# Patient Record
Sex: MALE | Race: BLACK OR AFRICAN AMERICAN | Employment: FULL TIME | URBAN - METROPOLITAN AREA
[De-identification: names, ages, dates, MRNs, and addresses within clinical notes are randomized per-mention and may not be internally consistent; named-entity substitution may affect disease eponyms.]

---

## 2018-06-04 PROBLEM — I63.9 CVA (CEREBRAL VASCULAR ACCIDENT) (HCC): Status: ACTIVE | Noted: 2018-06-04

## 2019-02-20 ENCOUNTER — OFFICE VISIT (OUTPATIENT)
Dept: FAMILY MEDICINE CLINIC | Age: 49
End: 2019-02-20

## 2019-02-20 VITALS
WEIGHT: 201.4 LBS | HEART RATE: 77 BPM | HEIGHT: 70 IN | DIASTOLIC BLOOD PRESSURE: 86 MMHG | SYSTOLIC BLOOD PRESSURE: 129 MMHG | OXYGEN SATURATION: 98 % | RESPIRATION RATE: 16 BRPM | BODY MASS INDEX: 28.83 KG/M2 | TEMPERATURE: 96.4 F

## 2019-02-20 DIAGNOSIS — R59.1 LYMPHADENOPATHY: ICD-10-CM

## 2019-02-20 DIAGNOSIS — R20.2 PARESTHESIA OF BOTH HANDS: ICD-10-CM

## 2019-02-20 DIAGNOSIS — Z12.5 SCREENING PSA (PROSTATE SPECIFIC ANTIGEN): ICD-10-CM

## 2019-02-20 DIAGNOSIS — L23.0 ALLERGIC CONTACT DERMATITIS DUE TO METALS: ICD-10-CM

## 2019-02-20 DIAGNOSIS — Z13.29 SCREENING FOR THYROID DISORDER: Primary | ICD-10-CM

## 2019-02-20 DIAGNOSIS — Z20.2 STD EXPOSURE: ICD-10-CM

## 2019-02-20 DIAGNOSIS — R22.0 MASS OF SUBMANDIBULAR REGION: ICD-10-CM

## 2019-02-20 DIAGNOSIS — Z86.73 HISTORY OF CVA (CEREBROVASCULAR ACCIDENT): ICD-10-CM

## 2019-02-20 DIAGNOSIS — R93.89 ABNORMAL CT SCAN, NECK: ICD-10-CM

## 2019-02-20 RX ORDER — GUAIFENESIN 100 MG/5ML
81 LIQUID (ML) ORAL DAILY
Qty: 90 TAB | Refills: 0 | Status: SHIPPED | OUTPATIENT
Start: 2019-02-20 | End: 2021-12-24

## 2019-02-20 RX ORDER — ROSUVASTATIN CALCIUM 5 MG/1
5 TABLET, COATED ORAL
Qty: 90 TAB | Refills: 0 | Status: SHIPPED | OUTPATIENT
Start: 2019-02-20 | End: 2021-12-24

## 2019-02-20 RX ORDER — AMLODIPINE BESYLATE 5 MG/1
5 TABLET ORAL DAILY
Qty: 90 TAB | Refills: 0 | Status: SHIPPED | OUTPATIENT
Start: 2019-02-20 | End: 2019-07-02 | Stop reason: DRUGHIGH

## 2019-02-20 RX ORDER — TACROLIMUS 0.3 MG/G
OINTMENT TOPICAL
Qty: 1 TUBE | Refills: 1 | Status: SHIPPED | OUTPATIENT
Start: 2019-02-20

## 2019-02-20 NOTE — PROGRESS NOTES
Chief Complaint   Patient presents with   [address] Cerebrovascular Accident     Patient states that he had a stroke 2/2018 but never had any followup after hospital discharge. 1. Have you been to the ER, urgent care clinic since your last visit? Hospitalized since your last visit? No    2. Have you seen or consulted any other health care providers outside of the [address] [place] [address] since your last visit? Include any pap smears or colon screening.  No

## 2019-02-21 DIAGNOSIS — Z13.29 SCREENING FOR THYROID DISORDER: ICD-10-CM

## 2019-02-21 DIAGNOSIS — R59.1 LYMPHADENOPATHY: ICD-10-CM

## 2019-02-21 DIAGNOSIS — Z86.73 HISTORY OF CVA (CEREBROVASCULAR ACCIDENT): ICD-10-CM

## 2019-02-24 NOTE — PROGRESS NOTES
ESTABLISH CARE VISIT    SUBJECTIVE:     Chief Complaint   Patient presents with   [address] [facility] [address] [facility] [address] Cerebrovascular Accident     Patient states that he had a stroke 2/2018 but never had any followup after hospital discharge. HPI: 50 y.o. male with PMHx significant for CVA is here for the above chief complaint(s). CVA  Patient went to [facility] in 6/2018 2 to having weakness in his left leg. MRI showed that he had multiple strokes in the last 6 months. Patient was discharged on aspirin, amlodipine and lipitor. Patient took these medications for 2 months, he saw Dr. Gareth Kearns and then decided to change diet/lifestyle (quit smoking) and quit medications. He also lost his insurance at that time, so that is why he was lost to follow-up. Patient has no residual weakness, numbness/tingling. He has not followed up with anyone since August/September. Cervical Lymphadenopathy/Submandibular Mass  Ongoing, for completeness, will outline the impressions from the CT/MRI from 6/2018. CT Chest:  IMPRESSION  Impression: Hyperinflation of the chest with COPD. No acute abnormalities. CT Abd/Pelvis:  Impression:   Degenerative disc disease L5-S1. Abdomen and pelvis CT scan otherwise negative. CT Head:  IMPRESSION:  Normal unenhanced CT scan of the head.     CTA Head:     1. No significant stenosis or aneurysm involving the [place]  vasculature.     2. No significant cervical carotid or vertebral artery stenosis.     3. Multiple enlarged left neck lymph nodes, most concerning for occult  malignancy with reactive lymphadenopathy considered less likely. Tissue sampling recommended. (Neck Soft Tissues: There is an enlarged left submandibular lymph node (level 1)  measuring 2.6 x 3.2 cm. There is an enlarged left level 2 lymph node measuring  2.1 x 1.9 cm. There are multiple enlarged left supraclavicular lymph nodes  measuring 1.2 and 1.4 cm respectively. Major salivary glands are within normal  limits. Visualized aerodigestive tract is unremarkable. No focal abnormality  involving the lung apices)    MRI Brain:  IMPRESSION:     1.  Multiple small acute lacunar infarcts in the right posterior frontal lobe in  an anterior cerebral artery distribution. 2.  Mild chronic microvascular ischemic changes. Addendum to MRI from radiology on 6/6/2018  Addendum     While the faint enhancement in the parafalcine right parietal lobe was initially  thought to represent a developmental venous anomaly, given the lymphadenopathy on CTA neck, followup MRI in 6-8 weeks is recommended to exclude neoplasm or metastasis.     Discussed with nurse Davies on 6/6/2018 at 9:50 AM.        STD Exposure  Patient would like screening for possible STD's. He has had mulitple partners and has not had any STD screenings recently. He denies any symptoms at this time. Denies headache, lightheadedness, dizziness, vision changes, chest pain at rest or with exertion, rapid/irregular heart rate, shortness of breath at rest or with exertion, cough, abdominal pain, leg swelling, leg pain. Health Maintenance:    Eye - 2017, patient wears glasses, recommended vision exam   Oral Health - patient saw dentist less than a year ago for root canal, hasn't followed up for cleaning  Hearing, no complaints  U/A -  no UTI sxs. Cardiac- denies history, denies chest pain. Pulmonary health/Smoking history- cigars, 3 cigars a week for 20 years  Testicular Exam - does self-exams, declines exam today. Prostate - no prostate cancer in the family, patient is low risk, screen at 50   Colonoscopy - 2005 and 2008- hemorrhoids, but otherwise WNL, no follow-up required, patient has occasional rectal bleeding (once every 3 months),  Will refer for colonoscopy      Review of Systems   Constitutional: Negative for chills, diaphoresis, fever, malaise/fatigue and weight loss.    HENT: Negative for congestion, ear pain, hearing loss, nosebleeds, sinus pain, sore throat and tinnitus. Eyes: Negative for blurred vision, double vision, pain and discharge. Respiratory: Negative for cough, hemoptysis, shortness of breath and wheezing. Cardiovascular: Negative for chest pain, palpitations, orthopnea, claudication, leg swelling and PND. Gastrointestinal: Negative for abdominal pain, blood in stool, constipation, diarrhea, heartburn, melena, nausea and vomiting. Genitourinary: Negative for dysuria, flank pain, frequency, hematuria and urgency. Musculoskeletal: Negative for back pain, joint pain, myalgias and neck pain. Skin: Positive for rash (abdominal- near belt line, posterior neck). Negative for itching. Neurological: Negative for dizziness, tingling, tremors, speech change, focal weakness, seizures, weakness and headaches. Endo/Heme/Allergies: Negative for polydipsia. Does not bruise/bleed easily. Psychiatric/Behavioral: Negative for depression, memory loss, substance abuse and suicidal ideas. The patient is not nervous/anxious and does not have insomnia. @Lightera@  No current outpatient medications on file. No current facility-administered medications for this visit. Health Maintenance   Topic Date Due    DTaP/Tdap/Td series (1 - Tdap) 09/16/1991    Influenza Age 5 to Adult  08/01/2018       Medications and Allergies: Reviewed and confirmed in the chart    Past Medical Hx: Reviewed and confirmed in the chart  Past Medical History:   Diagnosis Date    CVA (cerebral vascular accident) ([facility] [address]        There is no problem list on file for this patient. Family Hx, Surgical Hx, Social Hx: Reviewed and updated in EMR    OBJECTIVE:  Physical Exam   Constitutional: He is well-developed, well-nourished, and in no distress. No distress. HENT:   Head: Normocephalic and atraumatic. Right Ear: External ear normal.   Left Ear: External ear normal.   Mouth/Throat: Oropharynx is clear and moist. No oropharyngeal exudate.    Neck: Normal range of motion and full passive range of motion without pain. Normal carotid pulses, no hepatojugular reflux and no JVD present. Carotid bruit is not present. No tracheal deviation present. Thyroid mass present. No thyromegaly present. Large submandibular mass 2-3cm  Multiple enlarged cervical lymphadenopathy - bilateral  +supraclavicular lymphadenopathy- bilateral   Cardiovascular: Normal rate, regular rhythm, normal heart sounds and intact distal pulses. Exam reveals no gallop and no friction rub. No murmur heard. Pulses:       Carotid pulses are 1+ on the right side, and 1+ on the left side. Pulmonary/Chest: Effort normal and breath sounds normal. No stridor. No respiratory distress. He has no wheezes. He has no rales. He exhibits no tenderness. Abdominal: Soft. Bowel sounds are normal. He exhibits no distension and no mass. There is no tenderness. There is no rebound and no guarding. Lymphadenopathy:     He has cervical adenopathy. Skin: Skin is warm and dry. Rash noted. No purpura noted. Rash is macular. Rash is not papular, not maculopapular, not nodular, not pustular, not vesicular and not urticarial. He is not diaphoretic. No erythema. No pallor. Abdomen and posterior neck- +macular rash with mild scaling, hyperpigmented, no redness, warmth, drainage or bleeding. Psychiatric: Mood, memory, affect and judgment normal.   Vitals reviewed. Vitals:    02/20/19 1303   BP: 129/86   Pulse: 77   Resp: 16   Temp: 96.4 °F (35.8 °C)   TempSrc: Oral   SpO2: 98%   Weight: 201 lb 6.4 oz (91.4 kg)   Height: 5' 10\" (1.778 m)       BP Readings from Last 3 Encounters:   02/20/19 129/86     Wt Readings from Last 3 Encounters:   02/20/19 201 lb 6.4 oz (91.4 kg)       Nursing Notes Reviewed    ASSESSMENT AND PLAN  Diagnoses and all orders for this visit:    Explained to patient the seriousness of the findings from the MRI/CT of the neck and my concern for the mass/lymphadenopathy.  Advised urgency in the imaging and referral to ENT. Patient verbalizes understanding of consequences of non-compliance and the possible severity of illness. Will follow closely. 1. Screening for thyroid disorder  -     TSH 3RD GENERATION; Future    2. STD exposure  -     CHLAMYDIA/GC PCR; Future  -     T PALLIDUM AB; Future  -     HEP B SURFACE AG; Future  -     HEPATITIS C AB; Future  -     HIV 1/2 AG/AB, 4TH GENERATION,W RFLX CONFIRM; Future    3. Paresthesia of both hands  -     VITAMIN B12; Future    4. Screening PSA (prostate specific antigen)  -     PSA SCREENING (SCREENING); Future    5. History of CVA (cerebrovascular accident)  -     METABOLIC PANEL, COMPREHENSIVE; Future  -     LIPID PANEL; Future  -     DUPLEX CAROTID BILATERAL; Future  -     rosuvastatin (CRESTOR) 5 mg tablet; Take 1 Tab by mouth nightly. -     amLODIPine (NORVASC) 5 mg tablet; Take 1 Tab by mouth daily. -     aspirin 81 mg chewable tablet; Take 1 Tab by mouth daily. 6. Lymphadenopathy  -     CBC WITH AUTOMATED DIFF; Future  -     REFERRAL TO ENT-OTOLARYNGOLOGY  -     CT NECK SOFT TISSUE W CONT; Future  -     US THYROID/PARATHYROID/SOFT TISS; Future    7. Abnormal CT scan, neck  -     REFERRAL TO ENT-OTOLARYNGOLOGY  -     CT NECK SOFT TISSUE W CONT; Future    8. Mass of submandibular region  -     US THYROID/PARATHYROID/SOFT TISS; Future    9. 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 No orders of the defined types were placed in this encounter. No future appointments. AVS printed and provided to patient    Assessment and plan above discussed with patient, patient voiced understanding and agreement with plan. More than 50% of this 45 minute visit was spent face to face counseling the patient about the etiology and treatment options for the above health conditions outlined in assessment and plan. Malou MONTALVO  [address] [place] [address] Associates  [address] [place], [address] [place] [address], [address] [place] [address]   [place] [phone]  [phone]

## 2019-03-08 ENCOUNTER — LAB ONLY (OUTPATIENT)
Dept: FAMILY MEDICINE CLINIC | Age: 49
End: 2019-03-08

## 2019-03-08 ENCOUNTER — TELEPHONE (OUTPATIENT)
Dept: FAMILY MEDICINE CLINIC | Age: 49
End: 2019-03-08

## 2019-03-08 DIAGNOSIS — Z01.89 ROUTINE LAB DRAW: Primary | ICD-10-CM

## 2019-03-08 NOTE — TELEPHONE ENCOUNTER
Kam Marlow with [facility] following up on peer to peer for Ct of the neck. States see in connect care an order for Duplex carotid bilateral.  Kam Marlow inquiring if provider want this order instead of the CT. States please call on Monday to clarify order.   # [phone]

## 2019-03-08 NOTE — TELEPHONE ENCOUNTER
Brittanie Yadav called back to inform me that she was looking at the wrong chart and that provider must call [facility] to do a peer to peer. Call [employer] at [phone]. Chart routed to provider for peer to peer.

## 2019-03-08 NOTE — TELEPHONE ENCOUNTER
Tried to contact Karoline Felton at [facility] in reference to this procedure but she had left for the day. Spoke to another rep and was informed that Karoline Felton did not make any notes about this patient, so she was not able to help me. Informed her that I will call back Monday.

## 2019-03-11 ENCOUNTER — TELEPHONE (OUTPATIENT)
Dept: FAMILY MEDICINE CLINIC | Age: 49
End: 2019-03-11

## 2019-03-11 NOTE — TELEPHONE ENCOUNTER
Pt stopped by and brought his insurance denial for CT, which is scheduled for tomorrow. Please call to instruct.

## 2019-03-11 NOTE — TELEPHONE ENCOUNTER
Spoke to patient. Informed him that we received the paper that he dropped off about CT being denied. Advised patient to reschedule his CT scan for a couple of days so that provider can call [employer] to try to get CT approved. Patient verbalized understanding. Chart routed to provider for review.

## 2019-03-11 NOTE — TELEPHONE ENCOUNTER
Spoke with [employer]. Discussed with physician that this was denied because it was sent to a \"non-preferred facility. \" Physician recommended patient see ENT for tissue sampling. Agree with physician, she did say that I could reopen, but in the interest of not delaying care, will have him just follow-up with ENT. Called patient- patient said that his apt was canceled for tomorrow. ..called ENT and they said he did not have an apt scheduled. Patient was called to schedule and he asked about how much his out of pocket costs would be, but did not schedule. Called patient back, advised to call them and schedule and find out out of pocket costs (without the CT). He will call back to let me know what he decides. Malou Grant PA-C  [facility]  [phone]  [facility], [address] [facility]

## 2019-03-12 ENCOUNTER — TELEPHONE (OUTPATIENT)
Dept: FAMILY MEDICINE CLINIC | Age: 49
End: 2019-03-12

## 2019-03-12 DIAGNOSIS — E53.8 B12 DEFICIENCY: Primary | ICD-10-CM

## 2019-03-12 DIAGNOSIS — E78.41 ELEVATED LIPOPROTEIN(A): ICD-10-CM

## 2019-03-12 DIAGNOSIS — I63.9 CEREBROVASCULAR ACCIDENT (CVA), UNSPECIFIED MECHANISM (HCC): ICD-10-CM

## 2019-03-12 LAB
ALBUMIN SERPL-MCNC: 4.6 G/DL (ref 3.5–5.5)
ALBUMIN/GLOB SERPL: 1.4 {RATIO} (ref 1.2–2.2)
ALP SERPL-CCNC: 84 IU/L (ref 39–117)
ALT SERPL-CCNC: 18 IU/L (ref 0–44)
AST SERPL-CCNC: 19 IU/L (ref 0–40)
BASOPHILS # BLD AUTO: 0 X10E3/UL (ref 0–0.2)
BASOPHILS NFR BLD AUTO: 1 %
BILIRUB SERPL-MCNC: 0.5 MG/DL (ref 0–1.2)
BUN SERPL-MCNC: 9 MG/DL (ref 6–24)
BUN/CREAT SERPL: 9 (ref 9–20)
C TRACH RRNA SPEC QL NAA+PROBE: NORMAL
CALCIUM SERPL-MCNC: 9.8 MG/DL (ref 8.7–10.2)
CHLORIDE SERPL-SCNC: 101 MMOL/L (ref 96–106)
CHOLEST SERPL-MCNC: 276 MG/DL (ref 100–199)
CO2 SERPL-SCNC: 27 MMOL/L (ref 20–29)
CREAT SERPL-MCNC: 1 MG/DL (ref 0.76–1.27)
EOSINOPHIL # BLD AUTO: 0.1 X10E3/UL (ref 0–0.4)
EOSINOPHIL NFR BLD AUTO: 3 %
ERYTHROCYTE [DISTWIDTH] IN BLOOD BY AUTOMATED COUNT: 13.5 % (ref 12.3–15.4)
GLOBULIN SER CALC-MCNC: 3.3 G/DL (ref 1.5–4.5)
GLUCOSE SERPL-MCNC: 98 MG/DL (ref 65–99)
HBV SURFACE AG SERPL QL IA: NEGATIVE
HCT VFR BLD AUTO: 46.1 % (ref 37.5–51)
HCV AB S/CO SERPL IA: <0.1 S/CO RATIO (ref 0–0.9)
HDLC SERPL-MCNC: 42 MG/DL
HGB BLD-MCNC: 15.6 G/DL (ref 13–17.7)
HIV 1+2 AB+HIV1 P24 AG SERPL QL IA: NON REACTIVE
IMM GRANULOCYTES # BLD AUTO: 0 X10E3/UL (ref 0–0.1)
IMM GRANULOCYTES NFR BLD AUTO: 0 %
LDLC SERPL CALC-MCNC: 201 MG/DL (ref 0–99)
LYMPHOCYTES # BLD AUTO: 1.7 X10E3/UL (ref 0.7–3.1)
LYMPHOCYTES NFR BLD AUTO: 41 %
MCH RBC QN AUTO: 30.6 PG (ref 26.6–33)
MCHC RBC AUTO-ENTMCNC: 33.8 G/DL (ref 31.5–35.7)
MCV RBC AUTO: 90 FL (ref 79–97)
MONOCYTES # BLD AUTO: 0.6 X10E3/UL (ref 0.1–0.9)
MONOCYTES NFR BLD AUTO: 14 %
N GONORRHOEA RRNA SPEC QL NAA+PROBE: NORMAL
NEUTROPHILS # BLD AUTO: 1.7 X10E3/UL (ref 1.4–7)
NEUTROPHILS NFR BLD AUTO: 41 %
PLATELET # BLD AUTO: 192 X10E3/UL (ref 150–379)
POTASSIUM SERPL-SCNC: 5 MMOL/L (ref 3.5–5.2)
PROT SERPL-MCNC: 7.9 G/DL (ref 6–8.5)
PSA SERPL-MCNC: 0.4 NG/ML (ref 0–4)
RBC # BLD AUTO: 5.1 X10E6/UL (ref 4.14–5.8)
SODIUM SERPL-SCNC: 143 MMOL/L (ref 134–144)
SPECIMEN STATUS REPORT, ROLRST: NORMAL
T PALLIDUM AB SER QL IA: NEGATIVE
TRIGL SERPL-MCNC: 165 MG/DL (ref 0–149)
TSH SERPL DL<=0.005 MIU/L-ACNC: 1.16 UIU/ML (ref 0.45–4.5)
VIT B12 SERPL-MCNC: 267 PG/ML (ref 232–1245)
VLDLC SERPL CALC-MCNC: 33 MG/DL (ref 5–40)
WBC # BLD AUTO: 4.1 X10E3/UL (ref 3.4–10.8)

## 2019-03-12 RX ORDER — CYANOCOBALAMIN 1000 UG/ML
1000 INJECTION, SOLUTION INTRAMUSCULAR; SUBCUTANEOUS ONCE
Qty: 1 VIAL | Refills: 3
Start: 2019-03-12 | End: 2019-03-12

## 2019-03-12 NOTE — TELEPHONE ENCOUNTER
Spoke with Mariluz Beatty to see if she had spoken with patient, Mariluz Beatty spoke with her today.  This encounter will be closed

## 2019-03-12 NOTE — TELEPHONE ENCOUNTER
Called patient in regards to his blood work. Patient's cholesterol is very elevated. Patient had not been taking his lipitor when this was drawn. Patient is now taking 80mg daily of Lipitor. Also, advised low cholesterol diet. Patient would like referral to nutrition to help him with better choices. Will refer. He also has b12 deficiency. Will call in b12 injections for him to take q month x 4 months. He also states that he called the ENT specialist and it would cost him $250 to be seen by them and he is unable to afford this. I told him that I will ask referral specialist to try and find someone else.

## 2019-03-12 NOTE — TELEPHONE ENCOUNTER
Pt requesting to speak with Ryan Craig regarding ENT referral.  States was told to call back regarding deductible info.  States will be free until 1pm

## 2019-03-12 NOTE — TELEPHONE ENCOUNTER
Please let patient know that his carotid ultrasound was WNL. No further testing is necessary. Malou Grant PA-C  [facility]  [phone]  [address]

## 2019-03-13 NOTE — TELEPHONE ENCOUNTER
Left message for patient to call back. Need to inform patient of Carotid US results as detailed in providers message.

## 2019-03-15 NOTE — TELEPHONE ENCOUNTER
Spoke with patient, patient informed me Armin Monzon spoke with him yesterday.  But he thanked me for the call

## 2019-04-04 ENCOUNTER — APPOINTMENT (OUTPATIENT)
Dept: NUTRITION | Age: 49
End: 2019-04-04

## 2019-07-02 ENCOUNTER — OFFICE VISIT (OUTPATIENT)
Dept: FAMILY MEDICINE CLINIC | Age: 49
End: 2019-07-02

## 2019-07-02 VITALS
RESPIRATION RATE: 16 BRPM | DIASTOLIC BLOOD PRESSURE: 80 MMHG | TEMPERATURE: 97.4 F | BODY MASS INDEX: 27.49 KG/M2 | HEART RATE: 85 BPM | HEIGHT: 70 IN | WEIGHT: 192 LBS | OXYGEN SATURATION: 98 % | SYSTOLIC BLOOD PRESSURE: 140 MMHG

## 2019-07-02 DIAGNOSIS — Z86.73 HISTORY OF CVA (CEREBROVASCULAR ACCIDENT): Primary | ICD-10-CM

## 2019-07-02 DIAGNOSIS — I10 ESSENTIAL (PRIMARY) HYPERTENSION: ICD-10-CM

## 2019-07-02 DIAGNOSIS — E53.8 B12 DEFICIENCY: ICD-10-CM

## 2019-07-02 RX ORDER — CYANOCOBALAMIN 1000 UG/ML
1000 INJECTION, SOLUTION INTRAMUSCULAR; SUBCUTANEOUS
Qty: 3 ML | Refills: 29
Start: 2019-07-02 | End: 2021-12-24

## 2019-07-02 RX ORDER — AMLODIPINE BESYLATE 10 MG/1
10 TABLET ORAL DAILY
Qty: 30 TAB | Refills: 2 | Status: SHIPPED | OUTPATIENT
Start: 2019-07-02 | End: 2021-12-24

## 2019-07-02 NOTE — PROGRESS NOTES
Chief Complaint   Patient presents with    Form Completion     Wants disability paperwork completed     1. Have you been to the ER, urgent care clinic since your last visit? Hospitalized since your last visit? No    2. Have you seen or consulted any other health care providers outside of the [address] [place] [address] since your last visit? Include any pap smears or colon screening.  Dermatologist

## 2019-07-02 NOTE — PATIENT INSTRUCTIONS
High Cholesterol: Care Instructions  Your Care Instructions    Cholesterol is a type of fat in your blood. It is needed for many body functions, such as making new cells. Cholesterol is made by your body. It also comes from food you eat. High cholesterol means that you have too much of the fat in your blood. This raises your risk of a heart attack and stroke. LDL and HDL are part of your total cholesterol. LDL is the \"bad\" cholesterol. High LDL can raise your risk for heart disease, heart attack, and stroke. HDL is the \"good\" cholesterol. It helps clear bad cholesterol from the body. High HDL is linked with a lower risk of heart disease, heart attack, and stroke. Your cholesterol levels help your doctor find out your risk for having a heart attack or stroke. You and your doctor can talk about whether you need to lower your risk and what treatment is best for you. A heart-healthy lifestyle along with medicines can help lower your cholesterol and your risk. The way you choose to lower your risk will depend on how high your risk is for heart attack and stroke. It will also depend on how you feel about taking medicines. Follow-up care is a key part of your treatment and safety. Be sure to make and go to all appointments, and call your doctor if you are having problems. It's also a good idea to know your test results and keep a list of the medicines you take. How can you care for yourself at home? · Eat a variety of foods every day. Good choices include fruits, vegetables, whole grains (like oatmeal), dried beans and peas, nuts and seeds, soy products (like tofu), and fat-free or low-fat dairy products. · Replace butter, margarine, and hydrogenated or partially hydrogenated oils with olive and canola oils. (Canola oil margarine without trans fat is fine.)  · Replace red meat with fish, poultry, and soy protein (like tofu). · Limit processed and packaged foods like chips, crackers, and cookies.   · Bake, broil, or steam foods. Don't mariee them. · Be physically active. Get at least 30 minutes of exercise on most days of the week. Walking is a good choice. You also may want to do other activities, such as running, swimming, cycling, or playing tennis or team sports. · Stay at a healthy weight or lose weight by making the changes in eating and physical activity listed above. Losing just a small amount of weight, even 5 to 10 pounds, can reduce your risk for having a heart attack or stroke. · Do not smoke. When should you call for help? Watch closely for changes in your health, and be sure to contact your doctor if:    · You need help making lifestyle changes.     · You have questions about your medicine. Where can you learn more? Go to http://irlandaLesConciergesvidal.info/. Enter R300 in the search box to learn more about \"High Cholesterol: Care Instructions. \"  Current as of: July 22, 2018  Content Version: 11.9  © 2884-0798 [employer]. Care instructions adapted under license by [employer] (which disclaims liability or warranty for this information). If you have questions about a medical condition or this instruction, always ask your healthcare professional. [place] [address] any warranty or liability for your use of this information. Hyperlipidemia: After Your Visit  Your Care Instructions  Hyperlipidemia is too much fat in your blood. The body has several kinds of fat, including cholesterol and triglycerides. Your body needs fat for many things, such as making new cells. But too much fat in your blood increases your chances of having a heart attack or stroke. You may be able to lower your cholesterol and triglycerides with a heart-healthy diet, exercise, and if needed, medicine. Your doctor may want you to try lifestyle changes first to see whether they lower the fat in your blood.  You may need to take medicine if lifestyle changes do not lower the fat in your blood enough. Follow-up care is a key part of your treatment and safety. Be sure to make and go to all appointments, and call your doctor if you are having problems. Its also a good idea to know your test results and keep a list of the medicines you take. How can you care for yourself at home? Take your medicines  · Take your medicines exactly as prescribed. Call your doctor if you think you are having a problem with your medicine. · If you take medicine to lower your cholesterol, go to follow-up visits. You will need to have blood tests. · Do not take large doses of niacin, which is a B vitamin, while taking medicine called statins. It may increase the chance of muscle pain and liver problems. · Talk to your doctor about avoiding grapefruit juice if you are taking statins. Grapefruit juice can raise the level of this medicine in your blood. This could increase side effects. Eat more fruits, vegetables, and fiber  · Fruits and vegetables have lots of nutrients that help protect against heart disease, and they have little--if any--fat. Try to eat at least five servings a day. Dark green, deep orange, or yellow fruits and vegetables are healthy choices. · Keep carrots, celery, and other veggies handy for snacks. Buy fruit that is in season and store it where you can see it so that you will be tempted to eat it. Cook dishes that have a lot of veggies in them, such as stir-fries and soups. · Foods high in fiber may reduce your cholesterol and provide important vitamins and minerals. High-fiber foods include whole-grain cereals and breads, oatmeal, beans, brown rice, citrus fruits, and apples. · Buy whole-grain breads and cereals instead of white bread and pastries. Limit saturated fat  · Read food labels and try to avoid saturated fat and trans fat. They increase your risk of heart disease. · Use olive or canola oil when you cook.  Try cholesterol-lowering spreads, such as Benecol or Take Control. · Bake, broil, grill, or steam foods instead of frying them. · Limit the amount of high-fat meats you eat, including hot dogs and sausages. Cut out all visible fat when you prepare meat. · Eat fish, skinless poultry, and soy products such as tofu instead of high-fat meats. Soybeans may be especially good for your heart. Eat at least two servings of fish a week. Certain fish, such as salmon, contain omega-3 fatty acids, which may help reduce your risk of heart attack. · Choose low-fat or fat-free milk and dairy products. Get exercise, limit alcohol, and quit smoking  · Get more exercise. Work with your doctor to set up an exercise program. Even if you can do only a small amount, exercise will help you get stronger, have more energy, and manage your weight and your stress. Walking is an easy way to get exercise. Gradually increase the amount you walk every day. Aim for at least 30 minutes on most days of the week. You also may want to swim, bike, or do other activities. · Limit alcohol to no more than 2 drinks a day for men and 1 drink a day for women. · Do not smoke. If you need help quitting, talk to your doctor about stop-smoking programs and medicines. These can increase your chances of quitting for good. When should you call for help? Call 911 anytime you think you may need emergency care. For example, call if:  · You have symptoms of a heart attack. These may include:  ¨ Chest pain or pressure, or a strange feeling in the chest.  ¨ Sweating. ¨ Shortness of breath. ¨ Nausea or vomiting. ¨ Pain, pressure, or a strange feeling in the back, neck, jaw, or upper belly or in one or both shoulders or arms. ¨ Lightheadedness or sudden weakness. ¨ A fast or irregular heartbeat. After you call 911, the  may tell you to chew 1 adult-strength or 2 to 4 low-dose aspirin. Wait for an ambulance. Do not try to drive yourself. · You have signs of a stroke.  These may include:  ¨ Sudden numbness, paralysis, or weakness in your face, arm, or leg, especially on only one side of your body. ¨ New problems with walking or balance. ¨ Sudden vision changes. ¨ Drooling or slurred speech. ¨ New problems speaking or understanding simple statements, or feeling confused. ¨ A sudden, severe headache that is different from past headaches. · You passed out (lost consciousness). Call your doctor now or seek immediate medical care if:  · You have muscle pain or weakness. Watch closely for changes in your health, and be sure to contact your doctor if:  · You are very tired. · You have an upset stomach, gas, constipation, or belly pain or cramps. Where can you learn more? Go to [employer]  Enter C406 in the search box to learn more about \"Hyperlipidemia: After Your Visit. \"   © 1381-9785 [employer]. Care instructions adapted under license by [address] [place] [address] [employer] (which disclaims liability or warranty for this information). This care instruction is for use with your licensed healthcare professional. If you have questions about a medical condition or this instruction, always ask your healthcare professional. [place] [address] any warranty or liability for your use of this information. Content Version: 1.3.173022; Last Revised: October 13, 2011                 Low Back Pain: Exercises  Your Care Instructions  Here are some examples of typical rehabilitation exercises for your condition. Start each exercise slowly. Ease off the exercise if you start to have pain. Your doctor or physical therapist will tell you when you can start these exercises and which ones will work best for you. How to do the exercises  Press-up    1. Lie on your stomach, supporting your body with your forearms. 2. Press your elbows down into the floor to raise your upper back. As you do this, relax your stomach muscles and allow your back to arch without using your back muscles.  As your press up, do not let your hips or pelvis come off the floor. 3. Hold for 15 to 30 seconds, then relax. 4. Repeat 2 to 4 times. Alternate arm and leg (bird dog) exercise    1. Start on the floor, on your hands and knees. 2. Tighten your belly muscles. 3. Raise one leg off the floor, and hold it straight out behind you. Be careful not to let your hip drop down, because that will twist your trunk. 4. Hold for about 6 seconds, then lower your leg and switch to the other leg. 5. Repeat 8 to 12 times on each leg. 6. Over time, work up to holding for 10 to 30 seconds each time. 7. If you feel stable and secure with your leg raised, try raising the opposite arm straight out in front of you at the same time. Knee-to-chest exercise    1. Lie on your back with your knees bent and your feet flat on the floor. 2. Bring one knee to your chest, keeping the other foot flat on the floor (or keeping the other leg straight, whichever feels better on your lower back). 3. Keep your lower back pressed to the floor. Hold for at least 15 to 30 seconds. 4. Relax, and lower the knee to the starting position. 5. Repeat with the other leg. Repeat 2 to 4 times with each leg. 6. To get more stretch, put your other leg flat on the floor while pulling your knee to your chest.    Curl-ups    1. Lie on the floor on your back with your knees bent at a 90-degree angle. Your feet should be flat on the floor, about 12 inches from your buttocks. 2. Cross your arms over your chest. If this bothers your neck, try putting your hands behind your neck (not your head), with your elbows spread apart. 3. Slowly tighten your belly muscles and raise your shoulder blades off the floor. 4. Keep your head in line with your body, and do not press your chin to your chest.  5. Hold this position for 1 or 2 seconds, then slowly lower yourself back down to the floor. 6. Repeat 8 to 12 times. Pelvic tilt exercise    1.  Lie on your back with your knees bent.  2. \"Brace\" your stomach. This means to tighten your muscles by pulling in and imagining your belly button moving toward your spine. You should feel like your back is pressing to the floor and your hips and pelvis are rocking back. 3. Hold for about 6 seconds while you breathe smoothly. 4. Repeat 8 to 12 times. Heel dig bridging    1. Lie on your back with both knees bent and your ankles bent so that only your heels are digging into the floor. Your knees should be bent about 90 degrees. 2. Then push your heels into the floor, squeeze your buttocks, and lift your hips off the floor until your shoulders, hips, and knees are all in a straight line. 3. Hold for about 6 seconds as you continue to breathe normally, and then slowly lower your hips back down to the floor and rest for up to 10 seconds. 4. Do 8 to 12 repetitions. Hamstring stretch in doorway    1. Lie on your back in a doorway, with one leg through the open door. 2. Slide your leg up the wall to straighten your knee. You should feel a gentle stretch down the back of your leg. 3. Hold the stretch for at least 15 to 30 seconds. Do not arch your back, point your toes, or bend either knee. Keep one heel touching the floor and the other heel touching the wall. 4. Repeat with your other leg. 5. Do 2 to 4 times for each leg. Hip flexor stretch    1. Kneel on the floor with one knee bent and one leg behind you. Place your forward knee over your foot. Keep your other knee touching the floor. 2. Slowly push your hips forward until you feel a stretch in the upper thigh of your rear leg. 3. Hold the stretch for at least 15 to 30 seconds. Repeat with your other leg. 4. Do 2 to 4 times on each side. Wall sit    1. Stand with your back 10 to 12 inches away from a wall. 2. Lean into the wall until your back is flat against it. 3. Slowly slide down until your knees are slightly bent, pressing your lower back into the wall.   4. Hold for about 6 seconds, then slide back up the wall. 5. Repeat 8 to 12 times. Follow-up care is a key part of your treatment and safety. Be sure to make and go to all appointments, and call your doctor if you are having problems. It's also a good idea to know your test results and keep a list of the medicines you take. Where can you learn more? Go to http://irlanda-vidal.info/. Enter Z347 in the search box to learn more about \"Low Back Pain: Exercises. \"  Current as of: September 20, 2018  Content Version: 11.9  © 0627-2653 [employer]. Care instructions adapted under license by "[employer] (which disclaims liability or warranty for this information). If you have questions about a medical condition or this instruction, always ask your healthcare professional. [address] any warranty or liability for your use of this information. Back Pain: Care Instructions  Your Care Instructions    Back pain has many possible causes. It is often related to problems with muscles and ligaments of the back. It may also be related to problems with the nerves, discs, or bones of the back. Moving, lifting, standing, sitting, or sleeping in an awkward way can strain the back. Sometimes you don't notice the injury until later. Arthritis is another common cause of back pain. Although it may hurt a lot, back pain usually improves on its own within several weeks. Most people recover in 12 weeks or less. Using good home treatment and being careful not to stress your back can help you feel better sooner. Follow-up care is a key part of your treatment and safety. Be sure to make and go to all appointments, and call your doctor if you are having problems. It's also a good idea to know your test results and keep a list of the medicines you take. How can you care for yourself at home? · Sit or lie in positions that are most comfortable and reduce your pain.  Try one of these positions when you lie down:  ? Lie on your back with your knees bent and supported by large pillows. ? Lie on the floor with your legs on the seat of a sofa or chair. ? Lie on your side with your knees and hips bent and a pillow between your legs. ? Lie on your stomach if it does not make pain worse. · Do not sit up in bed, and avoid soft couches and twisted positions. Bed rest can help relieve pain at first, but it delays healing. Avoid bed rest after the first day of back pain. · Change positions every 30 minutes. If you must sit for long periods of time, take breaks from sitting. Get up and walk around, or lie in a comfortable position. · Try using a heating pad on a low or medium setting for 15 to 20 minutes every 2 or 3 hours. Try a warm shower in place of one session with the heating pad. · You can also try an ice pack for 10 to 15 minutes every 2 to 3 hours. Put a thin cloth between the ice pack and your skin. · Take pain medicines exactly as directed. ? If the doctor gave you a prescription medicine for pain, take it as prescribed. ? If you are not taking a prescription pain medicine, ask your doctor if you can take an over-the-counter medicine. · Take short walks several times a day. You can start with 5 to 10 minutes, 3 or 4 times a day, and work up to longer walks. Walk on level surfaces and avoid hills and stairs until your back is better. · Return to work and other activities as soon as you can. Continued rest without activity is usually not good for your back. · To prevent future back pain, do exercises to stretch and strengthen your back and stomach. Learn how to use good posture, safe lifting techniques, and proper body mechanics. When should you call for help? Call your doctor now or seek immediate medical care if:    · You have new or worsening numbness in your legs.     · You have new or worsening weakness in your legs.  (This could make it hard to stand up.)     · You lose control of your bladder or bowels.    Watch closely for changes in your health, and be sure to contact your doctor if:    · You have a fever, lose weight, or don't feel well.     · You do not get better as expected. Where can you learn more? Go to http://irlanda-vidal.info/. Enter E930 in the search box to learn more about \"Back Pain: Care Instructions. \"  Current as of: September 20, 2018  Content Version: 11.9  © 3693-4770 [employer]. Care instructions adapted under license by [employer] (which disclaims liability or warranty for this information). If you have questions about a medical condition or this instruction, always ask your healthcare professional. [address] any warranty or liability for your use of this information.

## 2019-07-03 DIAGNOSIS — Z86.73 HISTORY OF CVA (CEREBROVASCULAR ACCIDENT): ICD-10-CM

## 2019-07-25 NOTE — PROGRESS NOTES
Follow Up Visit Note    Chief Complaint   Patient presents with    Form Completion     Wants disability paperwork completed       HPI:  Claudell Bard is a 50 y.o.  male  has a past medical history of CVA (cerebral vascular accident) ([address] is here for the above complaint(s). H/O CVA  Patient has history of CVA. He states that he feels he has a residual \"drift\" from this stroke. He states that when he gets up and walks and feels himself drifting to the right. Patient also notes some changes in his speech patterns. He states that he occasionally will get \"tongue-tied. \"     Wt Readings from Last 3 Encounters:   07/02/19 192 lb (87.1 kg)   02/20/19 201 lb 6.4 oz (91.4 kg)   06/04/18 199 lb 15.3 oz (90.7 kg)       Hypertension  Patient is currently taking   Key CAD CHF Meds             rosuvastatin (CRESTOR) 5 mg tablet (Taking) Take 1 Tab by mouth nightly. aspirin 81 mg chewable tablet (Taking) Take 1 Tab by mouth daily. amLODIPine (NORVASC) 5 mg tablet (Taking) Take 1 Tab by mouth daily. amLODIPine (NORVASC) 5 mg tablet Take 1 Tab by mouth daily. aspirin 81 mg chewable tablet Take 1 Tab by mouth daily. atorvastatin (LIPITOR) 80 mg tablet Take 1 Tab by mouth nightly. . BP today is   BP Readings from Last 1 Encounters:   07/02/19 (!) 141/93     Patient has been taking medications as prescribed. Patient is not checking BP at home. Patient does not follow low salt diet. Patient is  currently exercising to include walking. Patient denies chest pain, shortness of breath, palpitations, lower extremity edema, dizziness/lightheadedness or syncopal episodes. BP Readings from Last 3 Encounters:   07/02/19 (!) 141/93   02/20/19 129/86   06/08/18 124/77     Repeat /80    Review of Systems   Constitutional: Negative for chills, fever, malaise/fatigue and weight loss. Eyes: Negative for blurred vision, double vision and pain.    Respiratory: Negative for cough, sputum production, shortness of breath and wheezing. Cardiovascular: Negative for chest pain, palpitations, orthopnea, claudication and leg swelling. Gastrointestinal: Negative for abdominal pain, constipation, diarrhea, nausea and vomiting. Genitourinary: Negative for dysuria, frequency and urgency. Neurological: Negative for dizziness, tingling and headaches. Current Outpatient Medications   Medication Sig    rosuvastatin (CRESTOR) 5 mg tablet Take 1 Tab by mouth nightly.  tacrolimus (PROTOPIC) 0.03 % ointment Apply thin amount to abdominal area and back of neck twice daily.  aspirin 81 mg chewable tablet Take 1 Tab by mouth daily.  amLODIPine (NORVASC) 5 mg tablet Take 1 Tab by mouth daily.  amLODIPine (NORVASC) 5 mg tablet Take 1 Tab by mouth daily.  aspirin 81 mg chewable tablet Take 1 Tab by mouth daily.  atorvastatin (LIPITOR) 80 mg tablet Take 1 Tab by mouth nightly. No current facility-administered medications for this visit. Health Maintenance   Topic Date Due    DTaP/Tdap/Td series (1 - Tdap) 09/16/1991    Influenza Age 5 to Adult  08/01/2019    Pneumococcal 0-64 years  Aged Out       There is no immunization history on file for this patient. Allergies and Medications: Reviewed and updated in EMR. Past Medical History:   Diagnosis Date    [facility] (cerebral vascular accident) [facility])        Surgical History: Reviewed and updated in EMR as appropriate. Social History: Reviewed and updated in EMR as appropriate. Family History: Reviewed and updated in EMR as appropriate. OBJECTIVE:   Visit Vitals  BP (!) 141/93   Pulse 85   Temp 97.4 °F (36.3 °C) (Oral)   Resp 16   Ht 5' 10\" (1.778 m)   Wt 192 lb (87.1 kg)   SpO2 98%   BMI 27.55 kg/m²        Physical Exam   Constitutional: He is oriented to person, place, and time and well-developed, well-nourished, and in no distress. No distress. Neck: Normal range of motion. Neck supple. No thyromegaly present.    Cardiovascular: Normal rate, regular rhythm, normal heart sounds and intact distal pulses. Exam reveals no gallop and no friction rub. No murmur heard. Pulmonary/Chest: Effort normal and breath sounds normal. No respiratory distress. He has no wheezes. He has no rales. He exhibits no tenderness. Lymphadenopathy:     He has no cervical adenopathy. Neurological: He is alert and oriented to person, place, and time. Skin: Skin is warm and dry. He is not diaphoretic. Psychiatric: Mood, memory, affect and judgment normal.   Vitals reviewed. LABS/RADIOLOGICAL TESTS:  Lab Results   Component Value Date/Time    WBC 4.1 03/08/2019 11:25 AM    HGB 15.6 03/08/2019 11:25 AM    HCT 46.1 03/08/2019 11:25 AM    PLATELET 736 61/78/3738 11:25 AM     Lab Results   Component Value Date/Time    Sodium 143 03/08/2019 11:25 AM    Potassium 5.0 03/08/2019 11:25 AM    Chloride 101 03/08/2019 11:25 AM    CO2 27 03/08/2019 11:25 AM    Glucose 98 03/08/2019 11:25 AM    BUN 9 03/08/2019 11:25 AM    Creatinine 1.00 03/08/2019 11:25 AM     Lab Results   Component Value Date/Time    Cholesterol, total 276 (H) 03/08/2019 11:25 AM    HDL Cholesterol 42 03/08/2019 11:25 AM    LDL, calculated 201 (H) 03/08/2019 11:25 AM    Triglyceride 165 (H) 03/08/2019 11:25 AM     No results found for: GPT  No results found for: HBA1C, HGBE8, SWF0HOTU, GJI7ISBQ      All lab results and radiological studies were reviewed and discussed with the patient. ASSESSMENT/PLAN:    Diagnoses and all orders for this visit:    1. History of CVA (cerebrovascular accident)  -     REFERRAL TO NEUROLOGY  -     LIPID PANEL; Future  -     METABOLIC PANEL, COMPREHENSIVE; Future    2. Essential (primary) hypertension  Increase amlodipine to:  -     amLODIPine (NORVASC) 10 mg tablet; Take 1 Tab by mouth daily. 3. B12 deficiency  -     cyanocobalamin (VITAMIN B12) 1,000 mcg/mL injection; 1 mL by IntraMUSCular route every thirty (30) days for 90 doses.     Patient verbalized understanding and agreement with the plan. Patient was given an after-visit summary. Follow-up in 1 month or sooner if worsening symptoms. More than 50% of this 25 min visit was spent counseling the patient face to face about etiology and treatment of health conditions outlined in assessment and plan        Malou Grant PA-C  [place] [phone]

## 2022-03-20 PROBLEM — I63.9 CVA (CEREBRAL VASCULAR ACCIDENT) (HCC): Status: ACTIVE | Noted: 2018-06-04

## 2023-01-31 RX ORDER — TACROLIMUS 0.3 MG/G
OINTMENT TOPICAL
COMMUNITY
Start: 2019-02-20

## 2023-09-21 NOTE — PROGRESS NOTES
Subjective: "I feel much better, just a little pain where the dressing is" Pt denies CP or SOB. No fever. OOB w minimal assist.     Vital Signs:  Vital Signs Last 24 Hrs  T(C): 36.7 (09-18-23 @ 08:00), Max: 36.9 (09-18-23 @ 01:12)  T(F): 98 (09-18-23 @ 08:00), Max: 98.4 (09-18-23 @ 01:12)  HR: 66 (09-18-23 @ 08:00) (66 - 90)  BP: 115/75 (09-18-23 @ 08:00) (99/69 - 125/72)  RR: 16 (09-18-23 @ 08:00) (16 - 18)  SpO2: 100% (09-18-23 @ 08:00) (96% - 100%) on (O2)    Telemetry/Alarms: tele SR  General: WD NAD  Neurology: Awake, nonfocal, MALIK x 4  Eyes: Scleras clear, PERRLA/ EOMI, Gross vision intact  ENT:Gross hearing intact, grossly patent pharynx, no stridor  Neck: Neck supple, trachea midline, No JVD,   Respiratory: CTA B/L, No wheezing, rales, rhonchi  CV: RRR, S1S2, no murmurs, rubs or gallops  Abdominal: Soft, NT, ND +BS, no BM, voiding.   Extremities: No edema, + peripheral pulses  Skin: No Rashes, Hematoma, Ecchymosis  Lymphatic: No Neck, axilla, groin LAD  Psych: Oriented x 3, normal affect  Incisions: Sternal incision c/d/i. no surrounding erythema. VAC dressing in place  Tubes: VAC with minimal output.   Relevant labs, radiology and Medications reviewed           CXR stable.              13.6   6.28  )-----------( 295      ( 17 Sep 2023 07:03 )             41.6     09-17    137  |  104  |  12  ----------------------------<  110<H>  3.5   |  24  |  1.12    Ca    9.1      17 Sep 2023 07:03  Phos  3.4     09-17  Mg     2.20     09-17        MEDICATIONS  (STANDING):  atorvastatin 20 milliGRAM(s) Oral at bedtime  heparin   Injectable 5000 Unit(s) SubCutaneous every 8 hours  influenza   Vaccine 0.5 milliLiter(s) IntraMuscular once  losartan 50 milliGRAM(s) Oral daily  piperacillin/tazobactam IVPB.. 3.375 Gram(s) IV Intermittent every 8 hours  vancomycin  IVPB 1250 milliGRAM(s) IV Intermittent every 12 hours    MEDICATIONS  (PRN):  acetaminophen     Tablet .. 650 milliGRAM(s) Oral every 6 hours PRN Mild Pain (1 - 3)  oxyCODONE    IR 5 milliGRAM(s) Oral every 4 hours PRN Severe Pain (7 - 10)    OR cultures- Few Gram neg rods. Unable to r/o Burkholderia pseudomallei  I&O's Summary    17 Sep 2023 07:01  -  18 Sep 2023 07:00  --------------------------------------------------------  IN: 2150 mL / OUT: 1450 mL / NET: 700 mL        50y Male  w/ PAST MEDICAL & SURGICAL HISTORY:  HTN (hypertension)      Hyperlipidemia      2019 novel coronavirus disease (COVID-19)      Tuberculosis      No significant past surgical history      admitted with complaints of Patient is a 50y old  Male who presents with a chief complaint of Infected hematoma/ sternal abscess (17 Sep 2023 21:05)  51yo M with h/o HTN< COVID< HLD, with sternal abscess. s/p I&D of Chest wall abscess on 9/15/23. OR cult with grm neg rods. Pt afebrile. On Vanco/Zosyn. 9/17- Wound VAC dressing placed.     PLAN  Neuro: Pain management  Pulm: Encourage coughing, deep breathing and use of incentive spirometry.  Daily CXR.   Cardio: Monitor telemetry/alarms. Will obtain Echo to r/o NVE/vegetation  GI: Tolerating diet. Continue stool softeners.  Renal: monitor urine output, supplement electrolytes as needed  Vasc: Heparin SC/SCDs for DVT prophylaxis  Heme: Stable H/H. .   ID: On vanco/zosyn for now. OR cult Grm neg rods. Will consult ID  Therapy: OOB/ambulate  Tubes: Cont VAC dressing, will need upon discharge, Paperwork completed.   Disposition: Aim to D/C to home once medically/surgically cleared.   Discussed with Cardiothoracic Team at AM rounds.   Patient seen and examined at bedside with Dr. Andujar.  Patient resting comfortably in bed, on RA, in NAD.  Patient states he is feeling very good today. Mentioned that the wound vac may be delivered today.  Denies any acute complaints or acute overnight events.    Vital Signs:  Vital Signs Last 24 Hrs  T(C): 36.4 (09-19-23 @ 11:47), Max: 36.8 (09-18-23 @ 17:24)  T(F): 97.6 (09-19-23 @ 11:47), Max: 98.3 (09-18-23 @ 17:24)  HR: 75 (09-19-23 @ 11:47) (66 - 96)  BP: 112/79 (09-19-23 @ 11:47) (112/79 - 132/79)  RR: 18 (09-19-23 @ 11:47) (17 - 18)  SpO2: 99% (09-19-23 @ 11:47) (96% - 100%)     Telemetry/Alarms: NSR  General: Well appearing, NAD  Eyes: Vision grossly intact, EOMI  ENMT: Hearing grossly intact. Airway grossly patent, no stridor  Neck: Neck supple, trachea midline  Cardiovascular: RRR, well perfused  Respiratory: Breathing comfortably on RA, no respiratory distress, no accessory muscle use.  Gastrointestinal: Soft, NT, ND  Extremities: MALIK x4  Vascular: Well perfused  Neurological: Nonfocal, no deficits  Psychiatric: Appropriate affect  Incisions: Sternal incision c/d/i. no surrounding erythema. VAC dressing in place  Tubes: VAC with minimal output.       Relevant labs, radiology and Medications reviewed                        14.7   6.34  )-----------( 314      ( 19 Sep 2023 06:24 )             43.4     09-19    138  |  106  |  13  ----------------------------<  89  3.8   |  21<L>  |  1.06    Ca    9.5      19 Sep 2023 06:24        MEDICATIONS  (STANDING):  atorvastatin 20 milliGRAM(s) Oral at bedtime  heparin   Injectable 5000 Unit(s) SubCutaneous every 8 hours  influenza   Vaccine 0.5 milliLiter(s) IntraMuscular once  losartan 50 milliGRAM(s) Oral daily  meropenem  IVPB      meropenem  IVPB 1000 milliGRAM(s) IV Intermittent every 8 hours    MEDICATIONS  (PRN):  acetaminophen     Tablet .. 650 milliGRAM(s) Oral every 6 hours PRN Mild Pain (1 - 3)  oxyCODONE    IR 5 milliGRAM(s) Oral every 4 hours PRN Severe Pain (7 - 10)    Pertinent Physical Exam  I&O's Summary    18 Sep 2023 07:01  -  19 Sep 2023 07:00  --------------------------------------------------------  IN: 480 mL / OUT: 550 mL / NET: -70 mL    19 Sep 2023 07:01  -  19 Sep 2023 15:24  --------------------------------------------------------  IN: 450 mL / OUT: 250 mL / NET: 200 mL        Assessment  49yo M with h/o HTN< COVID< HLD, with sternal abscess. s/p I&D of Chest wall abscess on 9/15/23. OR cult with grm neg rods. Pt afebrile. On Vanco/Zosyn. 9/17- Wound VAC dressing placed.   9/19 - Wound vac delivered to home. Wound care team to change dressing tomorrow. Awaiting final cultures/path. Echo done, normal.    PLAN  Neuro: Pain management  Pulm: Encourage coughing, deep breathing and use of incentive spirometry.  Daily CXR.   Cardio: Monitor telemetry/alarms. Echo normal.  GI: Tolerating diet. Continue stool softeners.  Renal: monitor urine output, supplement electrolytes as needed  Vasc: Heparin SC/SCDs for DVT prophylaxis  Heme: Stable H/H. .   ID: ID onboard. KATRIN following. HIV negative. Vanco/zosyn stopped, started on Meropenem, per ID. OR culture cannot r/o Burkholderia Pseudomallei. Will decide abx w/ ID following final culture.  Therapy: OOB/ambulate  Tubes: Cont VAC dressing, will need upon discharge, Paperwork completed. Delivered to house today, 9/19.  Disposition: Aim to D/C to home once final cultures/path resulted, abx decided and ID/KATRIN clearance.  Discussed with Cardiothoracic Team at AM rounds. S: Pt seen at bedside at AM rounds with team and with Dr Andujar. Pt sitting up in chair with no complaints and no acute issues overnight. Stable with pain control, wound vac working with dressing in chest and on IV abx.     Vitals   VSS    Neuro: A+O x 3, non-focal, speech clear and intact  Neck: supple, no JVD  CV: regular rate, regular rhythm, +S1S2, no murmurs or rub  Pulm/chest: no accessory muscle use noted. Mid sternal wound dressing with vac in place.  Abd: soft, NT, ND, +BS  Ext: Moves all extremities x 4, without clubbing/cyanosis/edema  Skin: warm, well perfused, no rashes  IV abx.   Midline in place.     Labs  Imaging  Reviewed         A/P  49yo M with h/o HTN< COVID< HLD, with sternal abscess. s/p I&D of Chest wall abscess on 9/15/23. OR cult with grm neg rods. Pt afebrile. On Vanco/Zosyn. 9/17- Wound VAC dressing placed.     9/19 - Wound vac delivered to home. Wound care team to change dressing tomorrow. Awaiting final cultures/path. Echo done, normal.  9/21 - Repeat TTE done, nml. Mid line in place. Wound vac in place, abx.         PLAN  Neuro: Pain management  Pulm: Encourage coughing, deep breathing and use of incentive spirometry.  Cardio: Monitor telemetry/alarms  GI: Tolerating diet. Continue stool softeners.  Renal: monitor urine output, supplement electrolytes as needed  Vasc: Heparin SC/SCDs for DVT prophylaxis  Heme: Stable H/H.  ID: Cont Meropenem.  Per ID, plan home w midline and IV Meropenem x 2 wks. Will follow up with ID in outpt per instructions/recs. F/u cultures, NGTD  Therapy: OOB/ambulate  Tubes: VAC  in palce, tomorrow prior to dc will change to wet to dry for dc and follow up outpt VNS to replace at home.   Disposition: Aim to D/C to home tomorrow after 1st meropenem dose   Discussed with Cardiothoracic Team at AM rounds.     Spoke with patient about his blood work. Patient verbalizes understanding. Subjective: 50yMale seen at bedside with Dr Hernández, patient currently c/o pain near I&D site with packing in place.     Vital Signs:  Vital Signs Last 24 Hrs  T(C): 36.6 (09-16-23 @ 09:30), Max: 36.9 (09-15-23 @ 16:47)  T(F): 97.9 (09-16-23 @ 09:30), Max: 98.4 (09-15-23 @ 16:47)  HR: 71 (09-16-23 @ 09:30) (62 - 87)  BP: 99/64 (09-16-23 @ 09:30) (99/64 - 131/85)  RR: 17 (09-16-23 @ 09:30) (12 - 18)  SpO2: 98% (09-16-23 @ 09:30) (95% - 100%) on (O2)    Pertinent Physical Exam:  Telemetry/Alarms: NSR   General: WN/WD NAD  Neurology: Awake, nonfocal  Respiratory: No wheezing, rales, rhonchi  CV: RRR  Abdominal: Soft, NT, ND +BS,   Lymphatic: No Neck, axilla, groin LAD  Psych: Oriented x 3, normal affect  Incisions: cdi      I&O's Summary    15 Sep 2023 07:01  -  16 Sep 2023 07:00  --------------------------------------------------------  IN: 875 mL / OUT: 900 mL / NET: -25 mL        Relevant labs, radiology and Medications reviewed                        14.0   7.18  )-----------( 335      ( 16 Sep 2023 05:28 )             42.5     09-16    132<L>  |  98  |  13  ----------------------------<  155<H>  4.0   |  18<L>  |  1.01    Ca    9.3      16 Sep 2023 05:28  Phos  3.7     09-16  Mg     2.10     09-16    TPro  7.9  /  Alb  4.3  /  TBili  0.7  /  DBili  x   /  AST  19  /  ALT  28  /  AlkPhos  76  09-14    PT/INR - ( 14 Sep 2023 23:43 )   PT: 11.8 sec;   INR: 1.06 ratio         PTT - ( 14 Sep 2023 23:43 )  PTT:29.5 sec      MEDICATIONS  (STANDING):  atorvastatin 20 milliGRAM(s) Oral at bedtime  heparin   Injectable 5000 Unit(s) SubCutaneous every 8 hours  influenza   Vaccine 0.5 milliLiter(s) IntraMuscular once  losartan 50 milliGRAM(s) Oral daily  piperacillin/tazobactam IVPB.. 3.375 Gram(s) IV Intermittent every 8 hours  sodium chloride 0.9%. 1000 milliLiter(s) (75 mL/Hr) IV Continuous <Continuous>  vancomycin  IVPB 1250 milliGRAM(s) IV Intermittent every 12 hours    MEDICATIONS  (PRN):  acetaminophen     Tablet .. 650 milliGRAM(s) Oral every 6 hours PRN Mild Pain (1 - 3)  oxyCODONE    IR 5 milliGRAM(s) Oral every 4 hours PRN Severe Pain (7 - 10)        Assessment  This 50 year old male had COVID in 6/2023 and he went to his PC and a pulmonologist to evaluate his breathing afterward.  He had a syncopal episode during a PFT test in 7/2023 and although he is unsure of having sustained any trauma, he has been c/o constant midsternal pain ever since.   Soon after the pain began, he noticed swelling at the site.  The swelling grew larger, became erythematous,  and often felt warm.  He was sent for a CT scan and was told that it was a "blood clot".  He c/o constant pain at the site and for that he has been taking Ibuprofen.  The area is tender as well.  He denies fevers.      9/16: POD 1 from 6.5 cm abscess, Simple incision and drainage of abscess 15-Sep-2023 22:22:52. Dressing change today, follow cx and possible wound vac tomorrow.      PLAN  Neuro: Pain management with OXY  Pulm: Encourage coughing, deep breathing and use of incentive spirometry.   Cardio: Monitor telemetry/alarms  GI: Tolerating diet. Continue stool softeners.  Vasc: Heparin SC/SCDs for DVT prophylaxis  Heme: Stable H/H.   ID: Stable WBC, Blood cx x2 NGTD; OR cx pending. Cont with vanc zosyn  Therapy: OOB/ambulate  Wound: ID wound, packed, change today. Wound vac tomorrow.   Disposition: Aim to D/C to home once wound vac and care set up   Discussed with Cardiothoracic Team at AM rounds.      Subjective: "I feel pretty good" Pt denies CP or SOB. OOB ad amarilys. Decreased pain at VAC site.     Vital Signs:  Vital Signs Last 24 Hrs  T(C): 36.7 (09-20-23 @ 12:04), Max: 36.7 (09-20-23 @ 08:21)  T(F): 98 (09-20-23 @ 12:04), Max: 98.1 (09-20-23 @ 08:21)  HR: 74 (09-20-23 @ 12:04) (69 - 87)  BP: 117/69 (09-20-23 @ 12:04) (107/73 - 124/74)  RR: 18 (09-20-23 @ 12:04) (18 - 18)  SpO2: 96% (09-20-23 @ 12:04) (95% - 98%) on (O2)    Telemetry/Alarms:  General: WD NAD  Neurology: Awake, nonfocal, MALIK x 4  Eyes: Scleras clear, PERRLA/ EOMI, Gross vision intact  ENT:Gross hearing intact, grossly patent pharynx, no stridor  Neck: Neck supple, trachea midline, No JVD,   Respiratory: CTA B/L, No wheezing, rales, rhonchi  CV: RRR, S1S2, no murmurs, rubs or gallops  Abdominal: Soft, NT, ND +BS, +BM  Extremities: No edema, + peripheral pulses  Skin: No Rashes, Hematoma, Ecchymosis  Lymphatic: No Neck, axilla, groin LAD  Psych: Oriented x 3, normal affect  Incisions: Sternal incision w VAC c/d/i. Minimal output.     Relevant labs, radiology and Medications reviewed                        14.3   6.40  )-----------( 321      ( 20 Sep 2023 06:26 )             41.9     09-20    139  |  107  |  17  ----------------------------<  92  3.8   |  20<L>  |  1.10    Ca    9.2      20 Sep 2023 06:26  Phos  3.4     09-20  Mg     2.20     09-20    OR cultures still Grm Neg rods, ?B. Psudomallei. Not finalized    MEDICATIONS  (STANDING):  atorvastatin 20 milliGRAM(s) Oral at bedtime  heparin   Injectable 5000 Unit(s) SubCutaneous every 8 hours  losartan 50 milliGRAM(s) Oral daily  meropenem  IVPB      meropenem  IVPB 1000 milliGRAM(s) IV Intermittent every 8 hours    MEDICATIONS  (PRN):  acetaminophen     Tablet .. 650 milliGRAM(s) Oral every 6 hours PRN Mild Pain (1 - 3)  oxyCODONE    IR 5 milliGRAM(s) Oral every 4 hours PRN Severe Pain (7 - 10)      I&O's Summary    19 Sep 2023 07:01  -  20 Sep 2023 07:00  --------------------------------------------------------  IN: 600 mL / OUT: 450 mL / NET: 150 mL    20 Sep 2023 07:01  -  20 Sep 2023 15:01  --------------------------------------------------------  IN: 480 mL / OUT: 0 mL / NET: 480 mL          Assessment  50y Male  w/ PAST MEDICAL & SURGICAL HISTORY:  HTN (hypertension)      Hyperlipidemia      2019 novel coronavirus disease (COVID-19)      Tuberculosis      No significant past surgical history  49yo M with h/o HTN< COVID< HLD, with sternal abscess. s/p I&D of Chest wall abscess on 9/15/23. OR cult with grm neg rods. Pt afebrile. On Vanco/Zosyn. 9/17- Wound VAC dressing placed.   9/19 - Wound vac delivered to home. Wound care team to change dressing tomorrow. Awaiting final cultures/path. Echo done, normal.        PLAN  Neuro: Pain management  Pulm: Encourage coughing, deep breathing and use of incentive spirometry..   Cardio: Monitor telemetry/alarms  GI: Tolerating diet. Continue stool softeners.  Renal: monitor urine output, supplement electrolytes as needed  Vasc: Heparin SC/SCDs for DVT prophylaxis  Heme: Stable H/H. .   ID: Cont Meropenem. F/u final cultures. Per ID, plan home w midline and IV Meropenem x 2 wks.   Therapy: OOB/ambulate  Tubes: VAC change to be done today by wound care.   Disposition: Aim to D/C to home once IV antibiotics arranged and midline placed. All orders submitted  Discussed with Cardiothoracic Team at AM rounds.      Subjective: Denies any chest pains or SOB    Vital Signs:  Vital Signs Last 24 Hrs  T(C): 36.4 (09-17-23 @ 12:57), Max: 37.1 (09-17-23 @ 08:45)  T(F): 97.5 (09-17-23 @ 12:57), Max: 98.8 (09-17-23 @ 08:45)  HR: 77 (09-17-23 @ 12:57) (64 - 88)  BP: 99/69 (09-17-23 @ 12:57) (94/62 - 114/71)  RR: 18 (09-17-23 @ 12:57) (17 - 18)  SpO2: 98% (09-17-23 @ 12:57) (98% - 100%) on (O2)      General: Awake, A&Ox3, NAD, laying in bed comfortably on room-air  Eyes: Scleras clear, PERRLA/ EOMI, Gross vision intact  ENT: Gross hearing intact, grossly patent pharynx, no stridor  Neck: Neck supple, trachea midline, No JVD  Respiratory: CTA B/L  CV: S1S2; no audible murmurs  Abdominal: +BS's x4, soft, ND/NT  Extremities: No edema, + peripheral pulses  Skin: No Rashes, Hematoma, Ecchymosis  Incisions: mid-sternal wound w/ good pink tissue, clean                          13.6   6.28  )-----------( 295      ( 17 Sep 2023 07:03 )             41.6       137  |  104  |  12  ----------------------------<  110<H>  3.5   |  24  |  1.12    Ca    9.1      Phos  3.4     Mg     2.20       MEDICATIONS  (STANDING):  atorvastatin 20 milliGRAM(s) Oral at bedtime  heparin   Injectable 5000 Unit(s) SubCutaneous every 8 hours  influenza   Vaccine 0.5 milliLiter(s) IntraMuscular once  losartan 50 milliGRAM(s) Oral daily  piperacillin/tazobactam IVPB.. 3.375 Gram(s) IV Intermittent every 8 hours  vancomycin  IVPB 1250 milliGRAM(s) IV Intermittent every 12 hours    MEDICATIONS  (PRN):  acetaminophen     Tablet .. 650 milliGRAM(s) Oral every 6 hours PRN Mild Pain (1 - 3)  oxyCODONE    IR 5 milliGRAM(s) Oral every 4 hours PRN Severe Pain (7 - 10)      Assessment  49 y/o male w/ PAST MEDICAL & SURGICAL HISTORY:  HTN (hypertension)  Hyperlipidemia  2019 novel coronavirus disease (COVID-19)  Tuberculosis    S/P Incision and Drainage of Chest Wall Abscess    PLAN  VAC dressing placed on patient this morning  Good seal  VAC lowered from 125mmHg to 100mmHg due to patient feeling pressure.  Patient states that he feels "better" now    One OR culture with few gram negative rods  WBC 6; Afebrile  Continues on Vanco and Zosyn  Vanco trough this morning 12.3    SQ heparin for DVT prophylaxis    VAC paperwork given to Case Management yesterday    Patient seen and discussed with Dr. Hernández this morning Follow Up:      Inverval History/ROS: Patient is a 50y old  Male who presents with a chief complaint of Infected hematoma/ sternal abscess (21 Sep 2023 13:11)    No fever  No events    Allergies    No Known Allergies    Intolerances        ANTIMICROBIALS:  meropenem  IVPB    meropenem  IVPB 1000 every 8 hours      OTHER MEDS:  acetaminophen     Tablet .. 650 milliGRAM(s) Oral every 6 hours PRN  atorvastatin 20 milliGRAM(s) Oral at bedtime  heparin   Injectable 5000 Unit(s) SubCutaneous every 8 hours  losartan 50 milliGRAM(s) Oral daily  oxyCODONE    IR 5 milliGRAM(s) Oral every 4 hours PRN      Vital Signs Last 24 Hrs  T(C): 36.6 (21 Sep 2023 12:15), Max: 36.9 (21 Sep 2023 08:15)  T(F): 97.9 (21 Sep 2023 12:15), Max: 98.4 (21 Sep 2023 08:15)  HR: 79 (21 Sep 2023 12:15) (64 - 86)  BP: 109/79 (21 Sep 2023 12:15) (101/63 - 123/89)  BP(mean): --  RR: 17 (21 Sep 2023 12:15) (16 - 18)  SpO2: 98% (21 Sep 2023 12:15) (96% - 98%)    Parameters below as of 21 Sep 2023 12:15  Patient On (Oxygen Delivery Method): room air        PHYSICAL EXAM:  General: [x ] non-toxic  HEAD/EYES: [ ] PERRL [x ] white sclera [ ] icterus  ENT:  [ ] normal [ x] supple [ ] thrush [ ] pharyngeal exudate  Cardiovascular:   [ ] murmur x[ ] normal [ ] PPM/AICD  Respiratory:  [x ] clear to ausculation bilaterally  GI:  [ ] soft, non-tender, normal bowel sounds  :  [ ] kent [ ] no CVA tenderness   Musculoskeletal:  [ ] no synovitis  Neurologic:  [ ] non-focal exam   Skin:  x[x ] vac in place  Lymph: [ ] no lymphadenopathy  Psychiatric:  [ ] appropriate affect [x ] alert & oriented  Lines:  [x ] no phlebitis [ ] central line                                14.3   6.40  )-----------( 321      ( 20 Sep 2023 06:26 )             41.9       09-20    139  |  107  |  17  ----------------------------<  92  3.8   |  20<L>  |  1.10    Ca    9.2      20 Sep 2023 06:26  Phos  3.4     09-20  Mg     2.20     09-20        Urinalysis Basic - ( 20 Sep 2023 06:26 )    Color: x / Appearance: x / SG: x / pH: x  Gluc: 92 mg/dL / Ketone: x  / Bili: x / Urobili: x   Blood: x / Protein: x / Nitrite: x   Leuk Esterase: x / RBC: x / WBC x   Sq Epi: x / Non Sq Epi: x / Bacteria: x        MICROBIOLOGY:Culture Results:   Few Gram Negative Rods  Unable to Rule Out Burkholderia pseudomallei (09-15-23 @ 23:05)  Culture Results:   Culture is being performed. (09-15-23 @ 23:05)  Culture Results:   No growth at 5 days (09-14-23 @ 23:43)  Culture Results:   No growth at 5 days (09-14-23 @ 23:35)      RADIOLOGY:     Follow Up:      Inverval History/ROS:Patient is a 50y old  Male who presents with a chief complaint of Infected hematoma/ sternal abscess (18 Sep 2023 10:56)    No fever  Feels well    Allergies    No Known Allergies    Intolerances        ANTIMICROBIALS:  meropenem  IVPB    meropenem  IVPB 1000 every 8 hours      OTHER MEDS:  acetaminophen     Tablet .. 650 milliGRAM(s) Oral every 6 hours PRN  atorvastatin 20 milliGRAM(s) Oral at bedtime  heparin   Injectable 5000 Unit(s) SubCutaneous every 8 hours  influenza   Vaccine 0.5 milliLiter(s) IntraMuscular once  losartan 50 milliGRAM(s) Oral daily  oxyCODONE    IR 5 milliGRAM(s) Oral every 4 hours PRN      Vital Signs Last 24 Hrs  T(C): 36.4 (19 Sep 2023 11:47), Max: 36.8 (18 Sep 2023 17:24)  T(F): 97.6 (19 Sep 2023 11:47), Max: 98.3 (18 Sep 2023 17:24)  HR: 75 (19 Sep 2023 11:47) (66 - 96)  BP: 112/79 (19 Sep 2023 11:47) (112/79 - 132/79)  BP(mean): --  RR: 18 (19 Sep 2023 11:47) (17 - 18)  SpO2: 99% (19 Sep 2023 11:47) (96% - 100%)    Parameters below as of 19 Sep 2023 11:47  Patient On (Oxygen Delivery Method): room air        PHYSICAL EXAM:  General: [x ] non-toxic  HEAD/EYES: [ ] PERRL [x ] white sclera [ ] icterus  ENT:  [ ] normal [x ] supple [ ] thrush [ ] pharyngeal exudate  Cardiovascular:   [ ] murmur [x ] normal [ ] PPM/AICD  Respiratory:  [x ] clear to ausculation bilaterally  GI:  [ x] soft, non-tender, normal bowel sounds  :  [ ] kent [ ] no CVA tenderness   Musculoskeletal:  [ ] no synovitis  Neurologic:  [ ] non-focal exam   Skin:  [ ] no rash  Lymph: [x ] no lymphadenopathy  Psychiatric:  [ ] appropriate affect [ ] alert & oriented  Lines:  [x ] no phlebitis [ ] central line                                14.7   6.34  )-----------( 314      ( 19 Sep 2023 06:24 )             43.4       09-19    138  |  106  |  13  ----------------------------<  89  3.8   |  21<L>  |  1.06    Ca    9.5      19 Sep 2023 06:24        Urinalysis Basic - ( 19 Sep 2023 06:24 )    Color: x / Appearance: x / SG: x / pH: x  Gluc: 89 mg/dL / Ketone: x  / Bili: x / Urobili: x   Blood: x / Protein: x / Nitrite: x   Leuk Esterase: x / RBC: x / WBC x   Sq Epi: x / Non Sq Epi: x / Bacteria: x        MICROBIOLOGY:Culture Results:   Few Gram Negative Rods  Unable to Rule Out Burkholderia pseudomallei (09-15-23 @ 23:05)  Culture Results:   No growth at 4 days (09-14-23 @ 23:43)  Culture Results:   No growth at 4 days (09-14-23 @ 23:35)      RADIOLOGY:     Follow Up:      Inverval History/ROS:Patient is a 50y old  Male who presents with a chief complaint of Infected hematoma/ sternal abscess (19 Sep 2023 15:21)    No fever  No events      Allergies    No Known Allergies    Intolerances        ANTIMICROBIALS:  meropenem  IVPB    meropenem  IVPB 1000 every 8 hours      OTHER MEDS:  acetaminophen     Tablet .. 650 milliGRAM(s) Oral every 6 hours PRN  atorvastatin 20 milliGRAM(s) Oral at bedtime  heparin   Injectable 5000 Unit(s) SubCutaneous every 8 hours  influenza   Vaccine 0.5 milliLiter(s) IntraMuscular once  losartan 50 milliGRAM(s) Oral daily  oxyCODONE    IR 5 milliGRAM(s) Oral every 4 hours PRN      Vital Signs Last 24 Hrs  T(C): 36.7 (20 Sep 2023 08:21), Max: 36.7 (20 Sep 2023 08:21)  T(F): 98.1 (20 Sep 2023 08:21), Max: 98.1 (20 Sep 2023 08:21)  HR: 75 (20 Sep 2023 08:21) (69 - 87)  BP: 109/77 (20 Sep 2023 08:21) (107/73 - 124/74)  BP(mean): --  RR: 18 (20 Sep 2023 08:21) (18 - 18)  SpO2: 96% (20 Sep 2023 08:21) (95% - 99%)    Parameters below as of 20 Sep 2023 08:21  Patient On (Oxygen Delivery Method): room air        PHYSICAL EXAM:  General: [x] non-toxic  HEAD/EYES: [ ] PERRL [ ] white sclera [ ] icterus  ENT:  [ ] normal [ ] supple [ ] thrush [ ] pharyngeal exudate  Cardiovascular:   [ ] murmur [ ] normal [ ] PPM/AICD  Respiratory:  [x ] clear to ausculation bilaterally  GI:  x[ ] soft, non-tender, normal bowel sounds  :  [ ] kent [ ] no CVA tenderness   Musculoskeletal:  [ ] no synovitis  Neurologic:  [ ] non-focal exam   Skin:  x[ ] no rash  Lymph: [ x] no lymphadenopathy  Psychiatric:  [ ] appropriate affect [x ] alert & oriented  Lines:  [ x] no phlebitis [ ] central line                                14.3   6.40  )-----------( 321      ( 20 Sep 2023 06:26 )             41.9       09-20    139  |  107  |  17  ----------------------------<  92  3.8   |  20<L>  |  1.10    Ca    9.2      20 Sep 2023 06:26  Phos  3.4     09-20  Mg     2.20     09-20        Urinalysis Basic - ( 20 Sep 2023 06:26 )    Color: x / Appearance: x / SG: x / pH: x  Gluc: 92 mg/dL / Ketone: x  / Bili: x / Urobili: x   Blood: x / Protein: x / Nitrite: x   Leuk Esterase: x / RBC: x / WBC x   Sq Epi: x / Non Sq Epi: x / Bacteria: x        MICROBIOLOGY:Culture Results:   Few Gram Negative Rods  Unable to Rule Out Burkholderia pseudomallei (09-15-23 @ 23:05)  Culture Results:   No growth at 5 days (09-14-23 @ 23:43)  Culture Results:   No growth at 5 days (09-14-23 @ 23:35)      RADIOLOGY: